# Patient Record
Sex: FEMALE | Race: WHITE | NOT HISPANIC OR LATINO | Employment: FULL TIME | URBAN - METROPOLITAN AREA
[De-identification: names, ages, dates, MRNs, and addresses within clinical notes are randomized per-mention and may not be internally consistent; named-entity substitution may affect disease eponyms.]

---

## 2024-07-22 ENCOUNTER — OFFICE VISIT (OUTPATIENT)
Dept: ORTHOPEDIC SURGERY | Facility: HOSPITAL | Age: 59
End: 2024-07-22
Payer: COMMERCIAL

## 2024-07-22 ENCOUNTER — APPOINTMENT (OUTPATIENT)
Dept: ORTHOPEDIC SURGERY | Facility: HOSPITAL | Age: 59
End: 2024-07-22
Payer: COMMERCIAL

## 2024-07-22 ENCOUNTER — HOSPITAL ENCOUNTER (OUTPATIENT)
Dept: RADIOLOGY | Facility: HOSPITAL | Age: 59
Discharge: HOME | End: 2024-07-22
Payer: COMMERCIAL

## 2024-07-22 VITALS — BODY MASS INDEX: 25.11 KG/M2 | HEIGHT: 67 IN | WEIGHT: 160 LBS

## 2024-07-22 DIAGNOSIS — M77.11 LATERAL EPICONDYLITIS OF RIGHT ELBOW: Primary | ICD-10-CM

## 2024-07-22 DIAGNOSIS — M25.521 RIGHT ELBOW PAIN: ICD-10-CM

## 2024-07-22 PROCEDURE — 73080 X-RAY EXAM OF ELBOW: CPT | Mod: RIGHT SIDE | Performed by: RADIOLOGY

## 2024-07-22 PROCEDURE — 99214 OFFICE O/P EST MOD 30 MIN: CPT | Performed by: EMERGENCY MEDICINE

## 2024-07-22 PROCEDURE — 99204 OFFICE O/P NEW MOD 45 MIN: CPT | Performed by: EMERGENCY MEDICINE

## 2024-07-22 PROCEDURE — A4467 BELT STRAP SLEEV GRMNT COVER: HCPCS | Performed by: ORTHOPAEDIC SURGERY

## 2024-07-22 PROCEDURE — 73080 X-RAY EXAM OF ELBOW: CPT | Mod: RT

## 2024-07-22 PROCEDURE — 3008F BODY MASS INDEX DOCD: CPT | Performed by: EMERGENCY MEDICINE

## 2024-07-22 RX ORDER — CITALOPRAM 20 MG/1
TABLET, FILM COATED ORAL
COMMUNITY

## 2024-07-22 RX ORDER — MELOXICAM 15 MG/1
15 TABLET ORAL DAILY
Qty: 30 TABLET | Refills: 1 | Status: SHIPPED | OUTPATIENT
Start: 2024-07-22 | End: 2024-09-20

## 2024-07-22 RX ORDER — ASPIRIN 81 MG/1
81 TABLET ORAL 2 TIMES DAILY
COMMUNITY

## 2024-07-22 RX ORDER — ESTRADIOL 1 MG/1
TABLET ORAL DAILY
COMMUNITY

## 2024-07-22 RX ORDER — CITALOPRAM 10 MG/1
TABLET ORAL
COMMUNITY

## 2024-07-22 NOTE — PROGRESS NOTES
"Subjective   Melvina Marcum is a 58 y.o. female who presents for Pain of the Right Elbow    Today, 7/22/2024, 58-year-old female right-handed patient presents with right lateral elbow pain. She noticed the pain starting around 1 month ago with no appreciable injury or increase in activity. 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 The pain is worse with these above activities. Mainly painful with twisting and wrist extension. She denies any neck pain or radicular symptoms. She denies any numbness or tingling but does have some weakness with lifting due to the pain. She denies any falls or trauma. Denies any overlying skin changes. She has tried ibuprofen 400mg twice daily prn and tylenol 500mg alternated with the ibuprofen. She is also wearing a sleeve and attempting activity modifications, but the pain is still persistent/getting worse. She notes the pain feels similar to when she had lateral epicondylitis in her left elbow and injections helped at that time.  She has not tried any physical therapy.        ROS: All pertinent positive symptoms are included in the history of present illness.    All other systems have been reviewed and are negative and noncontributory to this patient's current ailments.    Objective     Vitals:    07/22/24 1438   Weight: 72.6 kg (160 lb)   Height: 1.702 m (5' 7\")       Physical Exam  Musculoskeletal:      Right elbow: Tenderness (Along the right lateral elbow particularly at the extensor origin) present.       Elbow Musculoskeletal Exam    Inspection    Right      Right elbow inspection is normal.    Left      Left elbow inspection is normal.    Palpation    Right      Tenderness: present (Along the right lateral elbow particularly at the extensor origin)        Posterior-olecranon: none        Anterior: none        Ulnar nerve: none        MCL: none        LCL: none    Range of Motion    Right      Right elbow range of " motion is normal.    Left      Left elbow range of motion is normal.      Range of motion additional comments: Does have what she describes as a deep aching pain with supination and pronation as well as wrist extension.    Strength    Right      Right elbow strength is normal.      Left      Left elbow strength is normal.     Neurovascular    Right      Right elbow nerve sensation is normal.    Left      Left elbow nerve sensation is normal.    Special Tests    Right    Instability Exam      Varus test: positive        Valgus test: negative         Neurological Signs              Pain with resisted pronation: positive       Pain with resisted supination: positive     Epicondylitis Signs        Pain with resisted wrist flexion: positive       Pain with resisted wrist extension: positive        Imaging:                       I have personally reviewed and agree with Radiologist interpretation of previous imaging studies performed prior to this visit. I have included further imaging and interpretation as discussed below.     Radiographs:   Right Elbow Xray; Individual interpretation:  No obvious fracture or fat pad sign concerning for nondisplaced fracture, does have an esthesiophyte noted on the right lateral epicondyle.         Assessment/Plan   Problem List Items Addressed This Visit    None  Visit Diagnoses       Lateral epicondylitis of right elbow    -  Primary    Relevant Medications    meloxicam (Mobic) 15 mg tablet    Other Relevant Orders    MR elbow right wo IV contrast    Referral to Occupational Therapy    Tennis Elbow Strap    Right elbow pain        Relevant Orders    XR elbow right 3+ views          Patient is a 58-year-old female with lateral elbow pain.  Imaging, history and exam consistent with right lateral epicondylitis, but with her mild laxity with varus testing as well as significant tenderness with varus stressing of the RCL, concern for underlying additional RCL injury. Will provide patient  with a lateral epicondylar brace, prescribe physical therapy, and Mobic.  Will also have patient acquire an MRI of the right elbow to assess for radial collateral ligament injury.  Will hold off on CSI today due to concerns for RCL injury.     Tien Nguyen DO  [facility]       Patient was prescribed a counterforce brace for lateral epicondylitis. The patient has weakness, instability and/or deformity of their right elbow which requires stabilization from this orthosis to improve their function.      Verbal and written instructions for the use, wear schedule, cleaning and application of this item were given.  Patient was instructed that should the brace result in increased pain, decreased sensation, increased swelling, or an overall worsening of their medical condition, to please contact our office immediately.     Orthotic management and training was provided for skin care, modifications due to healing tissues, edema changes, interruption in skin integrity, and safety precautions with the orthosis.

## 2024-08-07 ENCOUNTER — HOSPITAL ENCOUNTER (OUTPATIENT)
Dept: RADIOLOGY | Facility: CLINIC | Age: 59
End: 2024-08-07
Payer: COMMERCIAL

## 2025-05-12 ENCOUNTER — APPOINTMENT (OUTPATIENT)
Dept: ORTHOPEDIC SURGERY | Facility: HOSPITAL | Age: 60
End: 2025-05-12
Payer: COMMERCIAL

## 2025-05-12 ENCOUNTER — APPOINTMENT (OUTPATIENT)
Dept: RADIOLOGY | Facility: HOSPITAL | Age: 60
End: 2025-05-12
Payer: COMMERCIAL

## 2025-05-12 DIAGNOSIS — M25.512 LEFT SHOULDER PAIN, UNSPECIFIED CHRONICITY: ICD-10-CM
